# Patient Record
Sex: FEMALE | Race: WHITE
[De-identification: names, ages, dates, MRNs, and addresses within clinical notes are randomized per-mention and may not be internally consistent; named-entity substitution may affect disease eponyms.]

---

## 2017-04-11 ENCOUNTER — HOSPITAL ENCOUNTER (OUTPATIENT)
Dept: HOSPITAL 61 - PCVCCLINIC | Age: 72
Discharge: HOME | End: 2017-04-11
Attending: INTERNAL MEDICINE
Payer: MEDICARE

## 2017-04-11 DIAGNOSIS — I25.10: ICD-10-CM

## 2017-04-11 DIAGNOSIS — R07.9: ICD-10-CM

## 2017-04-11 DIAGNOSIS — K21.9: ICD-10-CM

## 2017-04-11 DIAGNOSIS — Z86.73: ICD-10-CM

## 2017-04-11 DIAGNOSIS — R06.02: ICD-10-CM

## 2017-04-11 DIAGNOSIS — E78.5: ICD-10-CM

## 2017-04-11 DIAGNOSIS — I48.91: Primary | ICD-10-CM

## 2017-04-11 PROCEDURE — 80061 LIPID PANEL: CPT

## 2017-04-11 PROCEDURE — 93005 ELECTROCARDIOGRAM TRACING: CPT

## 2017-04-11 PROCEDURE — G0463 HOSPITAL OUTPT CLINIC VISIT: HCPCS

## 2018-06-12 ENCOUNTER — HOSPITAL ENCOUNTER (OUTPATIENT)
Dept: HOSPITAL 61 - PCVCCLINIC | Age: 73
Discharge: HOME | End: 2018-06-12
Attending: INTERNAL MEDICINE
Payer: MEDICARE

## 2018-06-12 DIAGNOSIS — I10: ICD-10-CM

## 2018-06-12 DIAGNOSIS — E11.9: ICD-10-CM

## 2018-06-12 DIAGNOSIS — R94.31: ICD-10-CM

## 2018-06-12 DIAGNOSIS — Z88.8: ICD-10-CM

## 2018-06-12 DIAGNOSIS — E78.00: ICD-10-CM

## 2018-06-12 DIAGNOSIS — G40.909: ICD-10-CM

## 2018-06-12 DIAGNOSIS — I48.2: ICD-10-CM

## 2018-06-12 DIAGNOSIS — Z86.73: ICD-10-CM

## 2018-06-12 DIAGNOSIS — I25.10: Primary | ICD-10-CM

## 2018-06-12 DIAGNOSIS — Z79.82: ICD-10-CM

## 2018-06-12 DIAGNOSIS — Z87.891: ICD-10-CM

## 2018-06-12 DIAGNOSIS — I34.0: ICD-10-CM

## 2018-06-12 DIAGNOSIS — Z79.899: ICD-10-CM

## 2018-06-12 DIAGNOSIS — Z79.4: ICD-10-CM

## 2018-06-12 PROCEDURE — 80061 LIPID PANEL: CPT

## 2018-06-12 PROCEDURE — 93005 ELECTROCARDIOGRAM TRACING: CPT

## 2018-12-31 ENCOUNTER — HOSPITAL ENCOUNTER (OUTPATIENT)
Dept: HOSPITAL 35 - ULTRA | Age: 73
End: 2018-12-31
Attending: FAMILY MEDICINE
Payer: COMMERCIAL

## 2018-12-31 DIAGNOSIS — M79.605: ICD-10-CM

## 2018-12-31 DIAGNOSIS — R60.0: ICD-10-CM

## 2018-12-31 DIAGNOSIS — M79.89: Primary | ICD-10-CM

## 2019-02-22 ENCOUNTER — HOSPITAL ENCOUNTER (OUTPATIENT)
Dept: HOSPITAL 35 - RAD | Age: 74
End: 2019-02-22
Attending: FAMILY MEDICINE
Payer: COMMERCIAL

## 2019-02-22 DIAGNOSIS — M25.562: ICD-10-CM

## 2019-02-22 DIAGNOSIS — J98.11: Primary | ICD-10-CM

## 2019-03-06 ENCOUNTER — HOSPITAL ENCOUNTER (OUTPATIENT)
Dept: HOSPITAL 61 - PCVCCLINIC | Age: 74
Discharge: HOME | End: 2019-03-06
Attending: INTERNAL MEDICINE
Payer: MEDICARE

## 2019-03-06 DIAGNOSIS — I10: ICD-10-CM

## 2019-03-06 DIAGNOSIS — Z86.73: ICD-10-CM

## 2019-03-06 DIAGNOSIS — R06.02: ICD-10-CM

## 2019-03-06 DIAGNOSIS — Z79.899: ICD-10-CM

## 2019-03-06 DIAGNOSIS — M19.90: ICD-10-CM

## 2019-03-06 DIAGNOSIS — I25.5: ICD-10-CM

## 2019-03-06 DIAGNOSIS — I25.10: ICD-10-CM

## 2019-03-06 DIAGNOSIS — I48.0: Primary | ICD-10-CM

## 2019-03-06 DIAGNOSIS — K21.9: ICD-10-CM

## 2019-03-06 PROCEDURE — 93005 ELECTROCARDIOGRAM TRACING: CPT

## 2019-03-06 PROCEDURE — 36415 COLL VENOUS BLD VENIPUNCTURE: CPT

## 2019-03-06 PROCEDURE — 80061 LIPID PANEL: CPT

## 2019-03-06 PROCEDURE — G0463 HOSPITAL OUTPT CLINIC VISIT: HCPCS

## 2019-05-23 ENCOUNTER — HOSPITAL ENCOUNTER (OUTPATIENT)
Dept: HOSPITAL 61 - PCVCIMAG | Age: 74
Discharge: HOME | End: 2019-05-23
Attending: INTERNAL MEDICINE
Payer: MEDICARE

## 2019-05-23 DIAGNOSIS — I10: ICD-10-CM

## 2019-05-23 DIAGNOSIS — I25.10: ICD-10-CM

## 2019-05-23 DIAGNOSIS — K21.9: ICD-10-CM

## 2019-05-23 DIAGNOSIS — E78.2: ICD-10-CM

## 2019-05-23 DIAGNOSIS — M19.90: ICD-10-CM

## 2019-05-23 DIAGNOSIS — Z79.4: ICD-10-CM

## 2019-05-23 DIAGNOSIS — I27.20: ICD-10-CM

## 2019-05-23 DIAGNOSIS — I42.9: ICD-10-CM

## 2019-05-23 DIAGNOSIS — Z87.891: ICD-10-CM

## 2019-05-23 DIAGNOSIS — E11.9: ICD-10-CM

## 2019-05-23 DIAGNOSIS — I48.2: ICD-10-CM

## 2019-05-23 DIAGNOSIS — I08.3: Primary | ICD-10-CM

## 2019-05-23 PROCEDURE — G0463 HOSPITAL OUTPT CLINIC VISIT: HCPCS

## 2019-05-23 PROCEDURE — 93005 ELECTROCARDIOGRAM TRACING: CPT

## 2019-05-23 PROCEDURE — 93308 TTE F-UP OR LMTD: CPT

## 2019-07-08 ENCOUNTER — HOSPITAL ENCOUNTER (OUTPATIENT)
Dept: HOSPITAL 35 - GI | Age: 74
Discharge: HOME | End: 2019-07-08
Attending: FAMILY MEDICINE
Payer: COMMERCIAL

## 2019-07-08 DIAGNOSIS — Z87.01: ICD-10-CM

## 2019-07-08 DIAGNOSIS — Z86.73: ICD-10-CM

## 2019-07-08 DIAGNOSIS — K31.9: ICD-10-CM

## 2019-07-08 DIAGNOSIS — D12.0: Primary | ICD-10-CM

## 2019-07-08 DIAGNOSIS — M19.90: ICD-10-CM

## 2019-07-08 DIAGNOSIS — K25.9: ICD-10-CM

## 2019-07-08 DIAGNOSIS — I48.91: ICD-10-CM

## 2019-07-08 DIAGNOSIS — I10: ICD-10-CM

## 2019-07-08 DIAGNOSIS — Z79.899: ICD-10-CM

## 2019-07-08 DIAGNOSIS — Z88.2: ICD-10-CM

## 2019-07-08 DIAGNOSIS — E03.9: ICD-10-CM

## 2019-07-08 DIAGNOSIS — Z79.01: ICD-10-CM

## 2019-07-08 DIAGNOSIS — K57.30: ICD-10-CM

## 2019-07-08 DIAGNOSIS — I25.2: ICD-10-CM

## 2019-07-08 DIAGNOSIS — Z88.0: ICD-10-CM

## 2019-07-08 DIAGNOSIS — D12.3: ICD-10-CM

## 2019-07-08 DIAGNOSIS — K21.0: ICD-10-CM

## 2019-07-08 DIAGNOSIS — Z88.8: ICD-10-CM

## 2019-07-08 DIAGNOSIS — K44.9: ICD-10-CM

## 2019-07-08 DIAGNOSIS — Z98.890: ICD-10-CM

## 2019-07-08 DIAGNOSIS — D12.2: ICD-10-CM

## 2019-07-08 LAB
ANION GAP SERPL CALC-SCNC: 10 MMOL/L (ref 7–16)
BUN SERPL-MCNC: 22 MG/DL (ref 7–18)
CALCIUM SERPL-MCNC: 8.8 MG/DL (ref 8.5–10.1)
CHLORIDE SERPL-SCNC: 104 MMOL/L (ref 98–107)
CO2 SERPL-SCNC: 27 MMOL/L (ref 21–32)
CREAT SERPL-MCNC: 0.9 MG/DL (ref 0.6–1)
ERYTHROCYTE [DISTWIDTH] IN BLOOD BY AUTOMATED COUNT: 18.8 % (ref 10.5–14.5)
GLUCOSE SERPL-MCNC: 124 MG/DL (ref 74–106)
HCT VFR BLD CALC: 25.9 % (ref 37–47)
HGB BLD-MCNC: 8.2 GM/DL (ref 12–15)
MCH RBC QN AUTO: 23.4 PG (ref 26–34)
MCHC RBC AUTO-ENTMCNC: 31.7 G/DL (ref 28–37)
MCV RBC: 73.7 FL (ref 80–100)
PLATELET # BLD: 140 THOU/UL (ref 150–400)
POTASSIUM SERPL-SCNC: 3.6 MMOL/L (ref 3.5–5.1)
RBC # BLD AUTO: 3.51 MIL/UL (ref 4.2–5)
SODIUM SERPL-SCNC: 141 MMOL/L (ref 136–145)
WBC # BLD AUTO: 5.8 THOU/UL (ref 4–11)

## 2019-07-08 PROCEDURE — 62900: CPT

## 2019-07-08 PROCEDURE — 62110: CPT

## 2019-07-24 ENCOUNTER — HOSPITAL ENCOUNTER (EMERGENCY)
Dept: HOSPITAL 35 - ER | Age: 74
Discharge: LEFT BEFORE DISCHARGE | End: 2019-07-24
Payer: COMMERCIAL

## 2019-07-24 VITALS — DIASTOLIC BLOOD PRESSURE: 65 MMHG | SYSTOLIC BLOOD PRESSURE: 129 MMHG

## 2019-07-24 VITALS — HEIGHT: 62 IN | BODY MASS INDEX: 40.48 KG/M2 | WEIGHT: 220 LBS

## 2019-07-24 DIAGNOSIS — E78.5: ICD-10-CM

## 2019-07-24 DIAGNOSIS — G25.81: ICD-10-CM

## 2019-07-24 DIAGNOSIS — I11.0: Primary | ICD-10-CM

## 2019-07-24 DIAGNOSIS — Z86.73: ICD-10-CM

## 2019-07-24 DIAGNOSIS — K21.9: ICD-10-CM

## 2019-07-24 DIAGNOSIS — I50.9: ICD-10-CM

## 2019-07-24 DIAGNOSIS — M19.90: ICD-10-CM

## 2019-07-24 DIAGNOSIS — D64.9: ICD-10-CM

## 2019-07-24 DIAGNOSIS — I48.91: ICD-10-CM

## 2019-07-24 DIAGNOSIS — E03.9: ICD-10-CM

## 2019-07-24 DIAGNOSIS — M81.0: ICD-10-CM

## 2019-07-24 DIAGNOSIS — Z90.89: ICD-10-CM

## 2019-07-24 LAB
ALBUMIN SERPL-MCNC: 3.4 G/DL (ref 3.4–5)
ALT SERPL-CCNC: 29 U/L (ref 30–65)
ANION GAP SERPL CALC-SCNC: 10 MMOL/L (ref 7–16)
ANISOCYTOSIS BLD QL SMEAR: (no result)
AST SERPL-CCNC: 21 U/L (ref 15–37)
BILIRUB SERPL-MCNC: 0.4 MG/DL
BUN SERPL-MCNC: 25 MG/DL (ref 7–18)
CALCIUM SERPL-MCNC: 8.9 MG/DL (ref 8.5–10.1)
CHLORIDE SERPL-SCNC: 106 MMOL/L (ref 98–107)
CO2 SERPL-SCNC: 27 MMOL/L (ref 21–32)
CREAT SERPL-MCNC: 1 MG/DL (ref 0.6–1)
EOSINOPHIL NFR BLD: 1 % (ref 0–3)
ERYTHROCYTE [DISTWIDTH] IN BLOOD BY AUTOMATED COUNT: 29.8 % (ref 10.5–14.5)
GLUCOSE SERPL-MCNC: 127 MG/DL (ref 74–106)
GRANULOCYTES NFR BLD MANUAL: 80 % (ref 36–66)
HCT VFR BLD CALC: 34.3 % (ref 37–47)
HGB BLD-MCNC: 10.5 GM/DL (ref 12–15)
LYMPHOCYTES NFR BLD AUTO: 11 % (ref 24–44)
MCH RBC QN AUTO: 25.5 PG (ref 26–34)
MCHC RBC AUTO-ENTMCNC: 30.6 G/DL (ref 28–37)
MCV RBC: 83.5 FL (ref 80–100)
MONOCYTES NFR BLD: 7 % (ref 1–8)
NEUTROPHILS # BLD: 5.2 THOU/UL (ref 1.4–8.2)
NEUTS BAND NFR BLD: 1 % (ref 0–8)
PLATELET # BLD: 202 THOU/UL (ref 150–400)
POTASSIUM SERPL-SCNC: 3.9 MMOL/L (ref 3.5–5.1)
PROT SERPL-MCNC: 6.6 G/DL (ref 6.4–8.2)
RBC # BLD AUTO: 4.11 MIL/UL (ref 4.2–5)
SODIUM SERPL-SCNC: 143 MMOL/L (ref 136–145)
TROPONIN I SERPL-MCNC: <0.06 NG/ML (ref ?–0.06)
WBC # BLD AUTO: 6.4 THOU/UL (ref 4–11)

## 2019-07-25 ENCOUNTER — HOSPITAL ENCOUNTER (INPATIENT)
Dept: HOSPITAL 35 - 2N | Age: 74
LOS: 2 days | Discharge: HOME | DRG: 291 | End: 2019-07-27
Attending: INTERNAL MEDICINE | Admitting: INTERNAL MEDICINE
Payer: COMMERCIAL

## 2019-07-25 VITALS — BODY MASS INDEX: 46.5 KG/M2 | WEIGHT: 252.7 LBS | HEIGHT: 62 IN

## 2019-07-25 VITALS — DIASTOLIC BLOOD PRESSURE: 53 MMHG | SYSTOLIC BLOOD PRESSURE: 110 MMHG

## 2019-07-25 VITALS — SYSTOLIC BLOOD PRESSURE: 115 MMHG | DIASTOLIC BLOOD PRESSURE: 37 MMHG

## 2019-07-25 DIAGNOSIS — I11.0: Primary | ICD-10-CM

## 2019-07-25 DIAGNOSIS — Z91.19: ICD-10-CM

## 2019-07-25 DIAGNOSIS — I08.3: ICD-10-CM

## 2019-07-25 DIAGNOSIS — D64.9: ICD-10-CM

## 2019-07-25 DIAGNOSIS — I42.9: ICD-10-CM

## 2019-07-25 DIAGNOSIS — E78.5: ICD-10-CM

## 2019-07-25 DIAGNOSIS — J18.9: ICD-10-CM

## 2019-07-25 DIAGNOSIS — I27.20: ICD-10-CM

## 2019-07-25 DIAGNOSIS — Z88.0: ICD-10-CM

## 2019-07-25 DIAGNOSIS — E03.9: ICD-10-CM

## 2019-07-25 DIAGNOSIS — I50.23: ICD-10-CM

## 2019-07-25 DIAGNOSIS — Z88.2: ICD-10-CM

## 2019-07-25 DIAGNOSIS — E11.9: ICD-10-CM

## 2019-07-25 DIAGNOSIS — I48.2: ICD-10-CM

## 2019-07-25 DIAGNOSIS — Z88.8: ICD-10-CM

## 2019-07-25 LAB
%HYPO/RBC NFR BLD AUTO: (no result) %
ANION GAP SERPL CALC-SCNC: 10 MMOL/L (ref 7–16)
ANISOCYTOSIS BLD QL SMEAR: (no result)
BASOPHILS NFR BLD AUTO: 0.6 % (ref 0–2)
BUN SERPL-MCNC: 26 MG/DL (ref 7–18)
CALCIUM SERPL-MCNC: 8.9 MG/DL (ref 8.5–10.1)
CHLORIDE SERPL-SCNC: 106 MMOL/L (ref 98–107)
CO2 SERPL-SCNC: 28 MMOL/L (ref 21–32)
CREAT SERPL-MCNC: 1.2 MG/DL (ref 0.6–1)
EOSINOPHIL NFR BLD: 2.7 % (ref 0–3)
ERYTHROCYTE [DISTWIDTH] IN BLOOD BY AUTOMATED COUNT: 29 % (ref 10.5–14.5)
GLUCOSE SERPL-MCNC: 155 MG/DL (ref 74–106)
GRANULOCYTES NFR BLD MANUAL: 79.5 % (ref 36–66)
HCT VFR BLD CALC: 35.7 % (ref 37–47)
HGB BLD-MCNC: 10.9 GM/DL (ref 12–15)
LYMPHOCYTES NFR BLD AUTO: 10.2 % (ref 24–44)
MCH RBC QN AUTO: 25.6 PG (ref 26–34)
MCHC RBC AUTO-ENTMCNC: 30.5 G/DL (ref 28–37)
MCV RBC: 83.8 FL (ref 80–100)
MONOCYTES NFR BLD: 7 % (ref 1–8)
NEUTROPHILS # BLD: 4.5 THOU/UL (ref 1.4–8.2)
PLATELET # BLD: 214 THOU/UL (ref 150–400)
POLYCHROMASIA BLD QL SMEAR: (no result)
POTASSIUM SERPL-SCNC: 4.1 MMOL/L (ref 3.5–5.1)
RBC # BLD AUTO: 4.26 MIL/UL (ref 4.2–5)
SODIUM SERPL-SCNC: 144 MMOL/L (ref 136–145)
WBC # BLD AUTO: 5.6 THOU/UL (ref 4–11)

## 2019-07-25 PROCEDURE — 10081 I&D PILONIDAL CYST COMP: CPT

## 2019-07-25 NOTE — NUR
PT DIRECT ADMIT FROM DOCTORS OFFICE.  PT ORIENTED TO ROOM AND BEDSPACE -
ASSESSMENT AS CHARTED -  MEDS AS PER MAR -  IV STARTED IN [facility].  PT UP WITH
ASSITANCE TO BATHROOM - PT IS FALL RISK.  FALL RISK PRECAUTIONS IN PLACE.  TOOTIE
DIET AND FLUIDS.  CO'S OF PAIN IN LEFT RIB AREA DUE TO COUGHING.  NO CO'S AT
THE PRESENT TIME.

## 2019-07-26 VITALS — SYSTOLIC BLOOD PRESSURE: 139 MMHG | DIASTOLIC BLOOD PRESSURE: 76 MMHG

## 2019-07-26 VITALS — SYSTOLIC BLOOD PRESSURE: 140 MMHG | DIASTOLIC BLOOD PRESSURE: 49 MMHG

## 2019-07-26 VITALS — SYSTOLIC BLOOD PRESSURE: 123 MMHG | DIASTOLIC BLOOD PRESSURE: 46 MMHG

## 2019-07-26 VITALS — SYSTOLIC BLOOD PRESSURE: 125 MMHG | DIASTOLIC BLOOD PRESSURE: 61 MMHG

## 2019-07-26 VITALS — SYSTOLIC BLOOD PRESSURE: 138 MMHG | DIASTOLIC BLOOD PRESSURE: 67 MMHG

## 2019-07-26 VITALS — SYSTOLIC BLOOD PRESSURE: 134 MMHG | DIASTOLIC BLOOD PRESSURE: 57 MMHG

## 2019-07-26 VITALS — DIASTOLIC BLOOD PRESSURE: 46 MMHG | SYSTOLIC BLOOD PRESSURE: 123 MMHG

## 2019-07-26 LAB
ANION GAP SERPL CALC-SCNC: 10 MMOL/L (ref 7–16)
BUN SERPL-MCNC: 29 MG/DL (ref 7–18)
CALCIUM SERPL-MCNC: 9.1 MG/DL (ref 8.5–10.1)
CHLORIDE SERPL-SCNC: 104 MMOL/L (ref 98–107)
CO2 SERPL-SCNC: 28 MMOL/L (ref 21–32)
CREAT SERPL-MCNC: 1 MG/DL (ref 0.6–1)
GLUCOSE SERPL-MCNC: 125 MG/DL (ref 74–106)
POTASSIUM SERPL-SCNC: 3.5 MMOL/L (ref 3.5–5.1)
SODIUM SERPL-SCNC: 142 MMOL/L (ref 136–145)

## 2019-07-26 NOTE — NUR
ASSESSMENT AS CHARTED - MEDS AS PER MAR -  PT WITH NO CO'S OF PAIN OR NASUEA.
TOOTIE DIET AND FLUIDS.  NO COVERAGE REQUIRED FOR ACCUCHEECKS THIS SHIFT -
AMBULATING WITH STBY ASSIST TO THE BATHROOM.  PT WITH CTA - PE PROTOCOL
COMPLETED THIS AFTERNOON.  IV 20 GAUGE STARTED X 1 ATTEMPT IN R[facility] FOR CT.
 UP TO VISIT THIS AM.  NO CO'S AT THE PRESENT TIME.

## 2019-07-26 NOTE — NUR
RECEIVED PT'S CARE AT 1929; PT. SITTING AT THE BED SIDE OF THE BED; AXO4;
DURING ASSESSMENT C/O PAIN OVER L. SIDE; ST. "I DO NOT WANT TO TAKE ANYTHING
FOR PAIN"; REQUESTED HOME HS MEDICATION; PHYSICIAN NOTIFIED; ORDERS RECEIVED;
ST. WANTS TO TAKE HOME MEDICATIONS WHILE IN THE HOSPITAL; EXPLAINED SOMETIMES
MEDICATION MIGHT BE HOLD; REQUEST EXPLANATION FROM DR. MCKEON THE REASON TO
HOLD MEDICATION; INFORM WILL PASS ON REPORT & MIGHT ASK TO DR. MCKEON ON THE
MORNING; ST. UNDERSTANDING; HOME MEDICATION LIST UPDATE; COPY ON CHART; ASK
THE REASON BEHIND ANTIBIOTICS HAVE NOT BEING STARTED; PER REPORT DR. GRAY
AWARE; INFORM DR. GRAY AWARE OF IT; ST. UNDERSTANDING; ABLE TO REST THROUGH THE
NIGHT WITH EYES CLOSE; CALLED APROPIATELY; ASSESSMENT AS CHARGED; FOLLOWING
POC; MONITORING; WILL PASS ON REPORT.

## 2019-07-26 NOTE — NUR
Nutrition: Pt assessed d/t moderate risk per RD screening per BMI >40 (46.2 at
present). Nutrition education provided in detail as pt here w/ CHF too. Hx:
chronic CHF, HTN, DM (on insulin). Reports being on vacation w/ spouse the
last few weeks; eating out for every meal. Pt now understands how quickly CHF
symptoms can be exacerbated "by going off diet." States she learned her
lesson. Shared tips to keep weight/fluid retention down, reviewed low sodium
food choices, dining out tips, and encouraged more eating in/home cooked
meals. BGs staying well controlled,  mg/dl per 7/25, 119 mg/dl today.
Likely to d/c home tomorrow per chart. Low nutrition risk.

## 2019-07-27 VITALS — SYSTOLIC BLOOD PRESSURE: 137 MMHG | DIASTOLIC BLOOD PRESSURE: 81 MMHG

## 2019-07-27 VITALS — DIASTOLIC BLOOD PRESSURE: 78 MMHG | SYSTOLIC BLOOD PRESSURE: 149 MMHG

## 2019-07-27 LAB
ANION GAP SERPL CALC-SCNC: 9 MMOL/L (ref 7–16)
BUN SERPL-MCNC: 29 MG/DL (ref 7–18)
CALCIUM SERPL-MCNC: 9.1 MG/DL (ref 8.5–10.1)
CHLORIDE SERPL-SCNC: 105 MMOL/L (ref 98–107)
CO2 SERPL-SCNC: 27 MMOL/L (ref 21–32)
CREAT SERPL-MCNC: 1 MG/DL (ref 0.6–1)
GLUCOSE SERPL-MCNC: 121 MG/DL (ref 74–106)
POTASSIUM SERPL-SCNC: 3.9 MMOL/L (ref 3.5–5.1)
SODIUM SERPL-SCNC: 141 MMOL/L (ref 136–145)

## 2019-07-27 NOTE — NUR
ASSUMED CARE OF PT APPROX 0715, A&0X4, AMB SLOW AND STEADY, SEE INDIVIDUAL
INTERVENTIONS FOR ASSESSMENTS, ENCOURAGED TO USE CALL LIGHT FOR ANY NEEDS

## 2019-07-27 NOTE — NUR
RECEIVED PT'S CARE AROUND 1930; PT. SITTING ON THE BED SIDE; AOX4; RELATIVE AT
THE BED SIDE; DURING ASSESSMENT PT. C/O PAIN OVER L. SIDE FLANK; 2/10;
INTERMITTENT; ST. ABLE TO TOLERATE PAIN; NOTIFIED ABOUT HS MEDICATION &
DOSAGES; ST. UNDERSTANDING; HS MEDICATION GIVEN; AT 2130 NEW IV ANTIBIOTIC ON
EMAR; MEDICATION GIVEN; PT. COUGHING; STRONG EFFORT; C/O SOB & FLUSHING OVER
FACE OVER A BRIEF PERIOD DUE TO COUGHING; ASSISSTED & EDUCATED ABOUT TAKING
DEEP BREATH & SHAPPING LIPS AS BLOWING THROUGH A STROW; BREATHING IMPROVED; O2
SAT 98% ON RA; PER ORDER H1B1/RVP CULTURE TAKEN; SENT TO LAB; ABLE TO REST
AFTER MIDNIGHT WITH EYES CLOSED; ASSESSMENT AS CHARGED; FOLLOWING POC;
MONITORING; WILL PASS ON REPORT;

## 2019-08-05 ENCOUNTER — HOSPITAL ENCOUNTER (OUTPATIENT)
Dept: HOSPITAL 61 - PCVCCLINIC | Age: 74
Discharge: HOME | End: 2019-08-05
Attending: NURSE PRACTITIONER
Payer: MEDICARE

## 2019-08-05 DIAGNOSIS — I48.2: ICD-10-CM

## 2019-08-05 DIAGNOSIS — Z88.8: ICD-10-CM

## 2019-08-05 DIAGNOSIS — I11.0: ICD-10-CM

## 2019-08-05 DIAGNOSIS — D68.59: ICD-10-CM

## 2019-08-05 DIAGNOSIS — E11.9: ICD-10-CM

## 2019-08-05 DIAGNOSIS — K21.9: ICD-10-CM

## 2019-08-05 DIAGNOSIS — Z87.891: ICD-10-CM

## 2019-08-05 DIAGNOSIS — Z79.4: ICD-10-CM

## 2019-08-05 DIAGNOSIS — I50.23: ICD-10-CM

## 2019-08-05 DIAGNOSIS — I34.0: Primary | ICD-10-CM

## 2019-08-05 PROCEDURE — G0463 HOSPITAL OUTPT CLINIC VISIT: HCPCS

## 2019-08-05 PROCEDURE — 93005 ELECTROCARDIOGRAM TRACING: CPT

## 2019-10-07 ENCOUNTER — HOSPITAL ENCOUNTER (OUTPATIENT)
Dept: HOSPITAL 35 - GI | Age: 74
Discharge: HOME | End: 2019-10-07
Attending: INTERNAL MEDICINE
Payer: COMMERCIAL

## 2019-10-07 DIAGNOSIS — Z88.0: ICD-10-CM

## 2019-10-07 DIAGNOSIS — Z87.19: ICD-10-CM

## 2019-10-07 DIAGNOSIS — Z98.42: ICD-10-CM

## 2019-10-07 DIAGNOSIS — I50.9: ICD-10-CM

## 2019-10-07 DIAGNOSIS — Z79.899: ICD-10-CM

## 2019-10-07 DIAGNOSIS — E11.9: ICD-10-CM

## 2019-10-07 DIAGNOSIS — Z98.41: ICD-10-CM

## 2019-10-07 DIAGNOSIS — K25.9: Primary | ICD-10-CM

## 2019-10-07 DIAGNOSIS — E03.9: ICD-10-CM

## 2019-10-07 DIAGNOSIS — I48.91: ICD-10-CM

## 2019-10-07 DIAGNOSIS — D64.9: ICD-10-CM

## 2019-10-07 DIAGNOSIS — K31.89: ICD-10-CM

## 2019-10-07 DIAGNOSIS — I11.0: ICD-10-CM

## 2019-10-07 DIAGNOSIS — K44.9: ICD-10-CM

## 2019-10-07 DIAGNOSIS — Z88.2: ICD-10-CM

## 2019-10-07 DIAGNOSIS — I25.10: ICD-10-CM

## 2019-10-07 DIAGNOSIS — Z79.01: ICD-10-CM

## 2019-10-07 DIAGNOSIS — I48.92: ICD-10-CM

## 2019-10-07 DIAGNOSIS — E78.00: ICD-10-CM

## 2019-10-07 DIAGNOSIS — Z86.010: ICD-10-CM

## 2019-10-07 PROCEDURE — 62900: CPT

## 2019-10-07 PROCEDURE — 62110: CPT

## 2020-01-31 ENCOUNTER — HOSPITAL ENCOUNTER (OUTPATIENT)
Dept: HOSPITAL 35 - SJCVCIMAG | Age: 75
End: 2020-01-31
Attending: NURSE PRACTITIONER
Payer: COMMERCIAL

## 2020-01-31 DIAGNOSIS — Z79.4: ICD-10-CM

## 2020-01-31 DIAGNOSIS — M19.90: ICD-10-CM

## 2020-01-31 DIAGNOSIS — I10: ICD-10-CM

## 2020-01-31 DIAGNOSIS — Z87.891: ICD-10-CM

## 2020-01-31 DIAGNOSIS — I25.10: ICD-10-CM

## 2020-01-31 DIAGNOSIS — E78.5: ICD-10-CM

## 2020-01-31 DIAGNOSIS — I48.21: Primary | ICD-10-CM

## 2020-01-31 DIAGNOSIS — I25.5: ICD-10-CM

## 2020-01-31 DIAGNOSIS — Z79.84: ICD-10-CM

## 2020-01-31 DIAGNOSIS — Z90.09: ICD-10-CM

## 2020-02-21 ENCOUNTER — HOSPITAL ENCOUNTER (OUTPATIENT)
Dept: HOSPITAL 35 - SJCVC | Age: 75
End: 2020-02-21
Attending: INTERNAL MEDICINE
Payer: COMMERCIAL

## 2020-02-21 DIAGNOSIS — M19.90: ICD-10-CM

## 2020-02-21 DIAGNOSIS — Z79.899: ICD-10-CM

## 2020-02-21 DIAGNOSIS — I25.10: ICD-10-CM

## 2020-02-21 DIAGNOSIS — I87.2: ICD-10-CM

## 2020-02-21 DIAGNOSIS — E11.9: Primary | ICD-10-CM

## 2020-02-21 DIAGNOSIS — D68.59: ICD-10-CM

## 2020-02-21 DIAGNOSIS — Z79.4: ICD-10-CM

## 2020-02-21 DIAGNOSIS — I25.5: ICD-10-CM

## 2020-02-21 DIAGNOSIS — Z86.73: ICD-10-CM

## 2020-02-21 DIAGNOSIS — G40.909: ICD-10-CM

## 2020-02-21 DIAGNOSIS — K21.9: ICD-10-CM

## 2020-02-21 DIAGNOSIS — E78.00: ICD-10-CM

## 2020-02-21 DIAGNOSIS — I48.21: ICD-10-CM

## 2020-03-11 ENCOUNTER — HOSPITAL ENCOUNTER (OUTPATIENT)
Dept: HOSPITAL 35 - SJCVC | Age: 75
End: 2020-03-11
Attending: INTERNAL MEDICINE
Payer: COMMERCIAL

## 2020-03-11 DIAGNOSIS — I25.10: ICD-10-CM

## 2020-03-11 DIAGNOSIS — I48.21: Primary | ICD-10-CM

## 2020-03-11 DIAGNOSIS — I11.0: ICD-10-CM

## 2020-03-11 DIAGNOSIS — I25.5: ICD-10-CM

## 2020-03-11 DIAGNOSIS — I50.23: ICD-10-CM

## 2020-03-11 DIAGNOSIS — Z79.82: ICD-10-CM

## 2020-03-11 DIAGNOSIS — R94.31: ICD-10-CM

## 2020-03-11 DIAGNOSIS — I34.0: ICD-10-CM

## 2020-03-11 DIAGNOSIS — E11.9: ICD-10-CM

## 2020-03-11 DIAGNOSIS — Z79.4: ICD-10-CM

## 2020-03-11 DIAGNOSIS — Z79.899: ICD-10-CM

## 2020-04-26 ENCOUNTER — HOSPITAL ENCOUNTER (EMERGENCY)
Dept: HOSPITAL 35 - ER | Age: 75
Discharge: HOME | End: 2020-04-26
Payer: COMMERCIAL

## 2020-04-26 VITALS — BODY MASS INDEX: 43.25 KG/M2 | HEIGHT: 62 IN | WEIGHT: 235.01 LBS

## 2020-04-26 VITALS — SYSTOLIC BLOOD PRESSURE: 122 MMHG | DIASTOLIC BLOOD PRESSURE: 50 MMHG

## 2020-04-26 DIAGNOSIS — E03.9: ICD-10-CM

## 2020-04-26 DIAGNOSIS — E11.9: ICD-10-CM

## 2020-04-26 DIAGNOSIS — Z79.899: ICD-10-CM

## 2020-04-26 DIAGNOSIS — M19.90: ICD-10-CM

## 2020-04-26 DIAGNOSIS — Z88.6: ICD-10-CM

## 2020-04-26 DIAGNOSIS — Z88.8: ICD-10-CM

## 2020-04-26 DIAGNOSIS — Z86.73: ICD-10-CM

## 2020-04-26 DIAGNOSIS — F17.210: ICD-10-CM

## 2020-04-26 DIAGNOSIS — Z79.82: ICD-10-CM

## 2020-04-26 DIAGNOSIS — Z88.2: ICD-10-CM

## 2020-04-26 DIAGNOSIS — K21.9: ICD-10-CM

## 2020-04-26 DIAGNOSIS — E78.5: ICD-10-CM

## 2020-04-26 DIAGNOSIS — Z79.4: ICD-10-CM

## 2020-04-26 DIAGNOSIS — I10: ICD-10-CM

## 2020-04-26 DIAGNOSIS — Z88.1: ICD-10-CM

## 2020-04-26 DIAGNOSIS — L03.011: Primary | ICD-10-CM

## 2020-04-26 LAB
ANION GAP SERPL CALC-SCNC: 10 MMOL/L (ref 7–16)
BASOPHILS NFR BLD AUTO: 0.9 % (ref 0–2)
BUN SERPL-MCNC: 25 MG/DL (ref 7–18)
CALCIUM SERPL-MCNC: 8.7 MG/DL (ref 8.5–10.1)
CHLORIDE SERPL-SCNC: 102 MMOL/L (ref 98–107)
CO2 SERPL-SCNC: 27 MMOL/L (ref 21–32)
CREAT SERPL-MCNC: 1 MG/DL (ref 0.6–1)
EOSINOPHIL NFR BLD: 1.2 % (ref 0–3)
ERYTHROCYTE [DISTWIDTH] IN BLOOD BY AUTOMATED COUNT: 16.5 % (ref 10.5–14.5)
GLUCOSE SERPL-MCNC: 110 MG/DL (ref 74–106)
GRANULOCYTES NFR BLD MANUAL: 77.2 % (ref 36–66)
HCT VFR BLD CALC: 31.7 % (ref 37–47)
HGB BLD-MCNC: 10.4 GM/DL (ref 12–15)
LYMPHOCYTES NFR BLD AUTO: 11.5 % (ref 24–44)
MCH RBC QN AUTO: 27.6 PG (ref 26–34)
MCHC RBC AUTO-ENTMCNC: 32.7 G/DL (ref 28–37)
MCV RBC: 84.4 FL (ref 80–100)
MONOCYTES NFR BLD: 9.2 % (ref 1–8)
NEUTROPHILS # BLD: 4.8 THOU/UL (ref 1.4–8.2)
PLATELET # BLD: 192 THOU/UL (ref 150–400)
POTASSIUM SERPL-SCNC: 3.6 MMOL/L (ref 3.5–5.1)
RBC # BLD AUTO: 3.76 MIL/UL (ref 4.2–5)
SODIUM SERPL-SCNC: 139 MMOL/L (ref 136–145)
WBC # BLD AUTO: 6.3 THOU/UL (ref 4–11)

## 2020-06-22 ENCOUNTER — HOSPITAL ENCOUNTER (OUTPATIENT)
Dept: HOSPITAL 35 - SJCVC | Age: 75
End: 2020-06-22
Attending: INTERNAL MEDICINE
Payer: COMMERCIAL

## 2020-06-22 DIAGNOSIS — I25.5: ICD-10-CM

## 2020-06-22 DIAGNOSIS — I25.10: ICD-10-CM

## 2020-06-22 DIAGNOSIS — D68.59: ICD-10-CM

## 2020-06-22 DIAGNOSIS — R94.31: Primary | ICD-10-CM

## 2020-06-22 DIAGNOSIS — E78.00: ICD-10-CM

## 2020-06-22 DIAGNOSIS — Z79.4: ICD-10-CM

## 2020-06-22 DIAGNOSIS — Z87.891: ICD-10-CM

## 2020-06-22 DIAGNOSIS — G45.9: ICD-10-CM

## 2020-06-22 DIAGNOSIS — E11.9: ICD-10-CM

## 2020-06-22 DIAGNOSIS — I87.2: ICD-10-CM

## 2020-06-22 DIAGNOSIS — Z79.899: ICD-10-CM

## 2020-06-22 DIAGNOSIS — I48.21: ICD-10-CM

## 2020-06-22 DIAGNOSIS — I11.0: ICD-10-CM

## 2020-06-22 DIAGNOSIS — I50.23: ICD-10-CM

## 2020-07-15 ENCOUNTER — HOSPITAL ENCOUNTER (OUTPATIENT)
Dept: HOSPITAL 35 - SJCVCIMAG | Age: 75
End: 2020-07-15
Attending: INTERNAL MEDICINE
Payer: COMMERCIAL

## 2020-07-15 DIAGNOSIS — I11.0: ICD-10-CM

## 2020-07-15 DIAGNOSIS — I50.9: ICD-10-CM

## 2020-07-15 DIAGNOSIS — I25.5: ICD-10-CM

## 2020-07-15 DIAGNOSIS — Z79.899: ICD-10-CM

## 2020-07-15 DIAGNOSIS — Z87.891: ICD-10-CM

## 2020-07-15 DIAGNOSIS — I25.10: ICD-10-CM

## 2020-07-15 DIAGNOSIS — Z78.0: ICD-10-CM

## 2020-07-15 DIAGNOSIS — E11.9: ICD-10-CM

## 2020-07-15 DIAGNOSIS — E78.5: ICD-10-CM

## 2020-07-15 DIAGNOSIS — I49.3: ICD-10-CM

## 2020-07-15 DIAGNOSIS — Z79.4: ICD-10-CM

## 2020-07-15 DIAGNOSIS — Z01.818: Primary | ICD-10-CM

## 2020-07-15 DIAGNOSIS — I48.21: ICD-10-CM

## 2020-07-17 ENCOUNTER — HOSPITAL ENCOUNTER (OUTPATIENT)
Dept: HOSPITAL 35 - LAB | Age: 75
End: 2020-07-17
Payer: COMMERCIAL

## 2020-07-17 DIAGNOSIS — Z01.812: Primary | ICD-10-CM

## 2020-07-17 DIAGNOSIS — Z11.59: ICD-10-CM

## 2020-07-20 ENCOUNTER — HOSPITAL ENCOUNTER (OUTPATIENT)
Dept: HOSPITAL 35 - GI | Age: 75
Discharge: HOME | End: 2020-07-20
Attending: INTERNAL MEDICINE
Payer: COMMERCIAL

## 2020-07-20 VITALS — HEIGHT: 62.01 IN | BODY MASS INDEX: 42.7 KG/M2 | WEIGHT: 235.01 LBS

## 2020-07-20 DIAGNOSIS — Z98.890: ICD-10-CM

## 2020-07-20 DIAGNOSIS — Z88.2: ICD-10-CM

## 2020-07-20 DIAGNOSIS — M19.90: ICD-10-CM

## 2020-07-20 DIAGNOSIS — Z86.73: ICD-10-CM

## 2020-07-20 DIAGNOSIS — I48.21: ICD-10-CM

## 2020-07-20 DIAGNOSIS — K21.9: ICD-10-CM

## 2020-07-20 DIAGNOSIS — E03.9: ICD-10-CM

## 2020-07-20 DIAGNOSIS — Z09: Primary | ICD-10-CM

## 2020-07-20 DIAGNOSIS — Z79.899: ICD-10-CM

## 2020-07-20 DIAGNOSIS — E11.9: ICD-10-CM

## 2020-07-20 DIAGNOSIS — I10: ICD-10-CM

## 2020-07-20 DIAGNOSIS — Z87.891: ICD-10-CM

## 2020-07-20 DIAGNOSIS — Z86.010: ICD-10-CM

## 2020-07-20 DIAGNOSIS — Z88.8: ICD-10-CM

## 2020-07-20 DIAGNOSIS — Z88.0: ICD-10-CM

## 2020-07-20 DIAGNOSIS — D12.3: ICD-10-CM

## 2020-07-20 DIAGNOSIS — E78.5: ICD-10-CM

## 2020-07-20 DIAGNOSIS — Z79.4: ICD-10-CM

## 2020-07-20 DIAGNOSIS — Z79.01: ICD-10-CM

## 2020-07-20 PROCEDURE — 62900: CPT

## 2020-07-20 PROCEDURE — 62110: CPT

## 2021-01-11 ENCOUNTER — HOSPITAL ENCOUNTER (OUTPATIENT)
Dept: HOSPITAL 35 - LAB | Age: 76
End: 2021-01-11
Attending: FAMILY MEDICINE
Payer: COMMERCIAL

## 2021-01-11 DIAGNOSIS — Z20.822: Primary | ICD-10-CM

## 2021-07-15 ENCOUNTER — HOSPITAL ENCOUNTER (OUTPATIENT)
Dept: HOSPITAL 35 - SJCVC | Age: 76
End: 2021-07-15
Attending: INTERNAL MEDICINE
Payer: COMMERCIAL

## 2021-07-15 DIAGNOSIS — I10: ICD-10-CM

## 2021-07-15 DIAGNOSIS — G43.909: ICD-10-CM

## 2021-07-15 DIAGNOSIS — Z88.2: ICD-10-CM

## 2021-07-15 DIAGNOSIS — K21.9: ICD-10-CM

## 2021-07-15 DIAGNOSIS — Z79.899: ICD-10-CM

## 2021-07-15 DIAGNOSIS — Z88.8: ICD-10-CM

## 2021-07-15 DIAGNOSIS — I48.21: ICD-10-CM

## 2021-07-15 DIAGNOSIS — Z87.891: ICD-10-CM

## 2021-07-15 DIAGNOSIS — R94.31: Primary | ICD-10-CM

## 2021-07-15 DIAGNOSIS — Z79.4: ICD-10-CM

## 2021-07-15 DIAGNOSIS — Z86.73: ICD-10-CM

## 2021-07-15 DIAGNOSIS — E11.9: ICD-10-CM

## 2021-07-15 DIAGNOSIS — I87.2: ICD-10-CM

## 2021-07-15 DIAGNOSIS — Z88.0: ICD-10-CM

## 2021-07-15 DIAGNOSIS — I25.5: ICD-10-CM

## 2021-07-15 DIAGNOSIS — I45.4: ICD-10-CM

## 2021-07-15 DIAGNOSIS — E78.5: ICD-10-CM

## 2021-07-15 DIAGNOSIS — I25.10: ICD-10-CM

## 2021-07-15 DIAGNOSIS — Z88.1: ICD-10-CM

## 2021-07-15 DIAGNOSIS — Z72.89: ICD-10-CM

## 2021-08-03 ENCOUNTER — HOSPITAL ENCOUNTER (OUTPATIENT)
Dept: HOSPITAL 35 - SJCVCIMAG | Age: 76
End: 2021-08-03
Attending: INTERNAL MEDICINE
Payer: COMMERCIAL

## 2021-08-03 DIAGNOSIS — Z79.4: ICD-10-CM

## 2021-08-03 DIAGNOSIS — I25.5: ICD-10-CM

## 2021-08-03 DIAGNOSIS — I08.3: ICD-10-CM

## 2021-08-03 DIAGNOSIS — R94.31: Primary | ICD-10-CM

## 2021-08-03 DIAGNOSIS — I11.0: ICD-10-CM

## 2021-08-03 DIAGNOSIS — I48.21: ICD-10-CM

## 2021-08-03 DIAGNOSIS — I87.2: ICD-10-CM

## 2021-08-03 DIAGNOSIS — Z79.82: ICD-10-CM

## 2021-08-03 DIAGNOSIS — I45.4: ICD-10-CM

## 2021-08-03 DIAGNOSIS — Z72.89: ICD-10-CM

## 2021-08-03 DIAGNOSIS — I25.10: ICD-10-CM

## 2021-08-03 DIAGNOSIS — I27.20: ICD-10-CM

## 2021-08-03 DIAGNOSIS — I50.23: ICD-10-CM

## 2021-08-03 DIAGNOSIS — E11.9: ICD-10-CM

## 2021-08-03 DIAGNOSIS — R42: ICD-10-CM

## 2021-08-03 DIAGNOSIS — Z87.891: ICD-10-CM

## 2021-08-03 DIAGNOSIS — Z79.899: ICD-10-CM

## 2021-08-03 DIAGNOSIS — E78.5: ICD-10-CM

## 2021-09-03 NOTE — PATH
[facility]
[address]
[place]   [address]                     PATHOLOGY RPT PROCEDURE       
_______________________________________________________________________________
 
Name:       MERON CALABRESE             Room #:                     REG [facility] 
MMALAIKA.#:      [id number]     Account #:      [id number]  
Admission:  07/20/20    Date of Birth:  [birth year]  
Discharge:                                              Report #:    [id number]
                                                        Path Case #: [id number]
_______________________________________________________________________________
 
[facility] Accession Number: [id number]
.                                                                01
Material submitted:                                        .
hepatic flexure - HEPATIC FLEXURE POLYP
.                                                                01
Clinical history:                                          .
Pre-op diagnosis: History of polyps
Post-op diagnosis: Polypectomy
History of tubular adenoma
.                                                                02
**********************************************************************
Diagnosis:
Polypoid, hepatic flexure polyp, endoscopic biopsy:
- Tubular adenoma.
- Negative for high-grade dysplasia.
(IUV:harriett 07/21/2020)
QMS  07/21/2020  1058 Local
**********************************************************************
.                                                                02
Electronically signed:                                     .
Azra Win MD, Pathologist
NPI- [id number]
.                                                                01
Gross description:                                         .
The specimen is received in formalin, labeled "Meron Calabrese, hepatic
flexure polyp".  Received is a segment of pale tan soft tissue measuring
1.2 cm in maximum dimensions.  The specimen is submitted entirely in
cassette A1.
(CAA; 7/20/2020)
QAC/QA  07/21/2020  1057 Local
.                                                                02
Pathologist provided ICD-10:
D12.3
.                                                                02
CPT                                                        .
731426
Specimen Comment: A courtesy copy of this report has been sent to [phone]
[phone]
Specimen Comment: [phone]
Specimen Comment: Report sent to  / DR CASTELLON
Specimen Comment: A duplicate report has been generated due to demographic
updates.
***Performed at:  01
   [address]  [id number]
   MD Jt Perry MD Phone:  [phone]
***Performed at:  02
 
 
[address]   [address]                     PATHOLOGY RPT PROCEDURE       
_______________________________________________________________________________
 
Name:       MERON CALABRESE             Room #:                     REG CLI 
[facility].#:      [id number]     Account #:      [id number]  
Admission:  07/20/20    Date of Birth:  [birth year]  
Discharge:                                              Report #:    [id number]
                                                        Path Case #: [id number]
_______________________________________________________________________________
   [address]  [id number]
   MD Azra Win MD Phone:  [phone] show

## 2021-09-16 ENCOUNTER — HOSPITAL ENCOUNTER (OUTPATIENT)
Dept: HOSPITAL 35 - SJCVC | Age: 76
End: 2021-09-16
Attending: INTERNAL MEDICINE
Payer: COMMERCIAL

## 2021-09-16 DIAGNOSIS — Z79.899: ICD-10-CM

## 2021-09-16 DIAGNOSIS — Z79.82: ICD-10-CM

## 2021-09-16 DIAGNOSIS — I50.23: ICD-10-CM

## 2021-09-16 DIAGNOSIS — Z79.4: ICD-10-CM

## 2021-09-16 DIAGNOSIS — I38: ICD-10-CM

## 2021-09-16 DIAGNOSIS — Z88.8: ICD-10-CM

## 2021-09-16 DIAGNOSIS — I25.10: ICD-10-CM

## 2021-09-16 DIAGNOSIS — I34.0: ICD-10-CM

## 2021-09-16 DIAGNOSIS — I48.21: ICD-10-CM

## 2021-09-16 DIAGNOSIS — I49.3: ICD-10-CM

## 2021-09-16 DIAGNOSIS — Z87.891: ICD-10-CM

## 2021-09-16 DIAGNOSIS — I45.9: ICD-10-CM

## 2021-09-16 DIAGNOSIS — Z88.0: ICD-10-CM

## 2021-09-16 DIAGNOSIS — Z86.73: ICD-10-CM

## 2021-09-16 DIAGNOSIS — E78.2: ICD-10-CM

## 2021-09-16 DIAGNOSIS — Z72.89: ICD-10-CM

## 2021-09-16 DIAGNOSIS — E78.00: ICD-10-CM

## 2021-09-16 DIAGNOSIS — E11.9: ICD-10-CM

## 2021-09-16 DIAGNOSIS — D68.59: ICD-10-CM

## 2021-09-16 DIAGNOSIS — R94.31: Primary | ICD-10-CM

## 2021-09-16 DIAGNOSIS — G45.9: ICD-10-CM

## 2021-09-16 DIAGNOSIS — I25.5: ICD-10-CM

## 2021-09-16 DIAGNOSIS — I11.0: ICD-10-CM

## 2021-11-03 ENCOUNTER — HOSPITAL ENCOUNTER (OUTPATIENT)
Dept: HOSPITAL 35 - SJCVC | Age: 76
End: 2021-11-03
Attending: INTERNAL MEDICINE
Payer: COMMERCIAL

## 2021-11-03 DIAGNOSIS — I87.2: ICD-10-CM

## 2021-11-03 DIAGNOSIS — I25.10: ICD-10-CM

## 2021-11-03 DIAGNOSIS — Z79.82: ICD-10-CM

## 2021-11-03 DIAGNOSIS — R94.31: ICD-10-CM

## 2021-11-03 DIAGNOSIS — E11.9: ICD-10-CM

## 2021-11-03 DIAGNOSIS — I38: ICD-10-CM

## 2021-11-03 DIAGNOSIS — Z88.0: ICD-10-CM

## 2021-11-03 DIAGNOSIS — D68.59: ICD-10-CM

## 2021-11-03 DIAGNOSIS — K21.9: ICD-10-CM

## 2021-11-03 DIAGNOSIS — Z72.89: ICD-10-CM

## 2021-11-03 DIAGNOSIS — I45.2: Primary | ICD-10-CM

## 2021-11-03 DIAGNOSIS — Z88.2: ICD-10-CM

## 2021-11-03 DIAGNOSIS — I25.5: ICD-10-CM

## 2021-11-03 DIAGNOSIS — Z88.1: ICD-10-CM

## 2021-11-03 DIAGNOSIS — I50.23: ICD-10-CM

## 2021-11-03 DIAGNOSIS — E78.5: ICD-10-CM

## 2021-11-03 DIAGNOSIS — E78.00: ICD-10-CM

## 2021-11-03 DIAGNOSIS — I48.21: ICD-10-CM

## 2021-11-03 DIAGNOSIS — G45.9: ICD-10-CM

## 2021-11-03 DIAGNOSIS — I10: ICD-10-CM

## 2021-11-03 DIAGNOSIS — Z88.8: ICD-10-CM

## 2021-11-03 DIAGNOSIS — Z87.891: ICD-10-CM

## 2021-11-03 DIAGNOSIS — Z79.899: ICD-10-CM

## 2021-11-03 DIAGNOSIS — Z79.4: ICD-10-CM

## 2021-12-01 ENCOUNTER — HOSPITAL ENCOUNTER (OUTPATIENT)
Dept: HOSPITAL 35 - RAD | Age: 76
End: 2021-12-01
Attending: NURSE PRACTITIONER
Payer: COMMERCIAL

## 2021-12-01 DIAGNOSIS — M77.32: ICD-10-CM

## 2021-12-01 DIAGNOSIS — M19.072: Primary | ICD-10-CM
